# Patient Record
Sex: MALE | Race: WHITE
[De-identification: names, ages, dates, MRNs, and addresses within clinical notes are randomized per-mention and may not be internally consistent; named-entity substitution may affect disease eponyms.]

---

## 2023-07-07 ENCOUNTER — HOSPITAL ENCOUNTER (EMERGENCY)
Dept: HOSPITAL 33 - ED | Age: 28
Discharge: HOME | End: 2023-07-07
Payer: MEDICAID

## 2023-07-07 VITALS — HEART RATE: 72 BPM | OXYGEN SATURATION: 98 %

## 2023-07-07 VITALS — DIASTOLIC BLOOD PRESSURE: 80 MMHG | SYSTOLIC BLOOD PRESSURE: 134 MMHG

## 2023-07-07 DIAGNOSIS — R11.0: ICD-10-CM

## 2023-07-07 DIAGNOSIS — R10.11: Primary | ICD-10-CM

## 2023-07-07 LAB
ALBUMIN SERPL-MCNC: 4.5 G/DL (ref 3.5–5)
ALP SERPL-CCNC: 73 U/L (ref 38–126)
ALT SERPL-CCNC: 33 U/L (ref 0–50)
AMYLASE SERPL-CCNC: 61 U/L (ref 30–110)
ANION GAP SERPL CALC-SCNC: 14 MEQ/L (ref 5–15)
AST SERPL QL: 28 U/L (ref 17–59)
BACTERIA UR CULT: NO
BASOPHILS # BLD AUTO: 0.04 X10^3/UL (ref 0–0.4)
BASOPHILS NFR BLD AUTO: 0.4 % (ref 0–0.4)
BILIRUB BLD-MCNC: 0.6 MG/DL (ref 0.2–1.3)
BUN SERPL-MCNC: 14 MG/DL (ref 9–20)
CALCIUM SPEC-MCNC: 9.2 MG/DL (ref 8.4–10.2)
CHLORIDE SERPL-SCNC: 107 MMOL/L (ref 98–107)
CO2 SERPL-SCNC: 23 MMOL/L (ref 22–30)
CREAT SERPL-MCNC: 1.02 MG/DL (ref 0.66–1.25)
EOSINOPHIL # BLD AUTO: 0.14 X10^3/UL (ref 0–0.5)
GFR SERPLBLD BASED ON 1.73 SQ M-ARVRAT: > 60 ML/MIN
GLUCOSE SERPL-MCNC: 128 MG/DL (ref 74–106)
HCT VFR BLD AUTO: 41.8 % (ref 42–50)
HGB BLD-MCNC: 13.6 G/DL (ref 12.5–18)
IMM GRANULOCYTES # BLD: 0.05 X10^3U/L (ref 0–0.03)
IMM GRANULOCYTES NFR BLD: 0.4 % (ref 0–0.4)
LIPASE SERPL-CCNC: 72 U/L (ref 23–300)
LYMPHOCYTES # SPEC AUTO: 2.4 X10^3/UL (ref 1–4.6)
MCH RBC QN AUTO: 26.2 PG (ref 26–32)
MCHC RBC AUTO-ENTMCNC: 32.5 G/DL (ref 32–36)
MONOCYTES # BLD AUTO: 0.75 X10^3/UL (ref 0–1.3)
NRBC # BLD AUTO: 0 X10^3U/L (ref 0–0.01)
NRBC BLD AUTO-RTO: 0 % (ref 0–0.1)
PLATELET # BLD AUTO: 263 X10^3/UL (ref 150–450)
POTASSIUM SERPLBLD-SCNC: 4.2 MMOL/L (ref 3.5–5.1)
PROT SERPL-MCNC: 7.8 G/DL (ref 6.3–8.2)
RBC # BLD AUTO: 5.19 X10^6/UL (ref 4.1–5.6)
RBC # URNS HPF: (no result) /HPF (ref 0–5)
SODIUM SERPL-SCNC: 140 MMOL/L (ref 137–145)
WBC # BLD AUTO: 11.2 X10^3/UL (ref 4–10.5)
WBC URNS QL MICRO: (no result) /HPF (ref 0–5)

## 2023-07-07 PROCEDURE — 96375 TX/PRO/DX INJ NEW DRUG ADDON: CPT

## 2023-07-07 PROCEDURE — 85025 COMPLETE CBC W/AUTO DIFF WBC: CPT

## 2023-07-07 PROCEDURE — 74176 CT ABD & PELVIS W/O CONTRAST: CPT

## 2023-07-07 PROCEDURE — 80053 COMPREHEN METABOLIC PANEL: CPT

## 2023-07-07 PROCEDURE — 81001 URINALYSIS AUTO W/SCOPE: CPT

## 2023-07-07 PROCEDURE — 36415 COLL VENOUS BLD VENIPUNCTURE: CPT

## 2023-07-07 PROCEDURE — 83690 ASSAY OF LIPASE: CPT

## 2023-07-07 PROCEDURE — 82150 ASSAY OF AMYLASE: CPT

## 2023-07-07 PROCEDURE — 96374 THER/PROPH/DIAG INJ IV PUSH: CPT

## 2023-07-07 PROCEDURE — 99284 EMERGENCY DEPT VISIT MOD MDM: CPT

## 2023-07-07 PROCEDURE — 36000 PLACE NEEDLE IN VEIN: CPT

## 2023-07-07 NOTE — ERPHSYRPT
- History of Present Illness


Time Seen by Provider: 07/07/23 14:34


Historian: patient


Exam Limitations: no limitations


Physician History: 





This is a 28-year-old white male who presents with 2 episodes of right upper 

quadrant abdominal pain.  Patient states he did not eat breakfast but he did 

have chicken nuggets for lunch.  Approxi-12:30 PM he began having the pain it r

esolved and then recurred.  The pain was severe he had associated nausea without

vomiting.  Patient states that he was in [employer] over the last several months and 

he has had these episodes at that time.  He was told he may have a gallbladder 

issue.  Patient denies chest pain.  Patient denies fevers.  He denies cough.  He

does not have shortness of breath.


Timing/Duration: today


Abdominal Pain Onset Location: RUQ


Pain Radiation: no radiation


Severity of Pain-Max: moderate


Severity of Pain-Current: mild (To moderate)


Modifying Factors: Improves With: nothing


Associated Symptoms: nausea, No chest pain, No diarrhea, No fever/chills, No 

shortness of breath, No vomiting


Previous symptoms: same symptoms as today, no recent treatment


Allergies/Adverse Reactions: 








codeine Allergy (Unknown, Verified 07/07/23 14:42)


   





Home Medications: 








No Reportable Medications [No Reported Medications]  07/07/23 [History]








Travel Risk





- International Travel


Have you traveled outside of the country in past 3 weeks: No





- Coronavirus Screening


Are you exhibiting any of the following symptoms?: No


Close contact with a COVID-19 positive Pt in past 14-21 Days: No





- Review of Systems


Constitutional: No Symptoms


Eyes: No Symptoms


Ears, Nose, & Throat: No Symptoms


Respiratory: No Symptoms


Cardiac: No Symptoms


Abdominal/Gastrointestinal: Abdominal Pain (Right upper quadrant), Nausea, No 

Constipation


Genitourinary Symptoms: No Symptoms


Musculoskeletal: No Symptoms


Skin: No Symptoms


Neurological: No Symptoms


Psychological: No Symptoms


Endocrine: No Symptoms


Hematologic/Lymphatic: No Symptoms


Immunological/Allergic: No Symptoms


All Other Systems: Reviewed and Negative





- Past Medical History


Pertinent Past Medical History: No





- Past Surgical History


Past Surgical History: No





- Nursing Vital Signs


Nursing Vital Signs: 


                               Initial Vital Signs











Temperature  97.3 F   07/07/23 14:37


 


Pulse Rate  84   07/07/23 14:37


 


Respiratory Rate  20   07/07/23 14:37


 


Blood Pressure  134/80   07/07/23 14:37


 


O2 Sat by Pulse Oximetry  97   07/07/23 14:37








                                   Pain Scale











Pain Intensity                 4

















- Physical Exam


General Appearance: no apparent distress, alert, anxiety


Eye Exam: PERRL/EOMI, eyes nml inspection


Ears, Nose, Throat Exam: normal ENT inspection, moist mucous membranes


Neck Exam: normal inspection, non-tender, supple, full range of motion


Respiratory Exam: normal breath sounds, lungs clear, airway intact, No chest 

tenderness, No respiratory distress


Cardiovascular Exam: regular rate/rhythm, normal heart sounds, normal peripheral

 pulses


Gastrointestinal/Abdomen Exam: soft, normal bowel sounds, tenderness (Right 

upper quadrant), guarding (Mild right upper quadrant palpation), No rebound


Rectal Exam: not done


Back Exam: normal inspection, normal range of motion, No CVA tenderness, No 

vertebral tenderness


Extremity Exam: normal inspection, normal range of motion, pelvis stable


Neurologic Exam: alert, oriented x 3, cooperative, CNs II-XII nml as tested, 

normal mood/affect, nml cerebellar function, nml station & gait, sensation nml


Skin Exam: normal color, warm, dry


Lymphatic Exam: No adenopathy


**SpO2 Interpretation**: normal


O2 Delivery: Room Air





- Course


Nursing assessment & vital signs reviewed: Yes


Ordered Tests: 


                               Active Orders 24 hr











 Category Date Time Status


 


 IV Insertion STAT Care  07/07/23 14:45 Active


 


 ABDOMEN AND PELVIS W/0 CONTRAS [CT] Stat Exams  07/07/23 14:45 Completed


 


 AMYLASE Stat Lab  07/07/23 14:30 Completed


 


 CBC W DIFF Stat Lab  07/07/23 14:30 Completed


 


 CMP Stat Lab  07/07/23 14:30 Completed


 


 LIPASE Stat Lab  07/07/23 14:30 Completed


 


 UA W/RFX UR CULTURE Stat Lab  07/07/23 16:55 Completed








Medication Summary














Discontinued Medications














Generic Name Dose Route Start Last Admin





  Trade Name Freq  PRN Reason Stop Dose Admin


 


Hydromorphone HCl  0.5 mg  07/07/23 14:45  07/07/23 15:17





  Hydromorphone 1 Mg/1ml Inj***  IV  07/07/23 14:46  0.5 mg





  STAT ONE   Administration


 


Hydromorphone HCl  Confirm  07/07/23 15:15 





  Hydromorphone 1 Mg/1ml Inj***  Administered  07/07/23 15:16 





  Dose  





  1 mg  





  .ROUTE  





  .STK-MED ONE  


 


Sodium Chloride  1,000 mls @ 999 mls/hr  07/07/23 14:45  07/07/23 16:35





  Sodium Chloride 0.9% 1000 Ml  IV  07/07/23 15:45  Infused





  .Q1H1M STA   Infusion


 


Sodium Chloride  Confirm  07/07/23 15:15 





  Sodium Chloride 0.9% 1000 Ml  Administered  07/07/23 15:16 





  Dose  





  1,000 mls @ ud  





  .ROUTE  





  .STK-MED ONE  


 


Ketorolac Tromethamine  30 mg  07/07/23 14:45  07/07/23 15:16





  Ketorolac Tromethamine 30 Mg/Ml Inj  IV  07/07/23 14:46  30 mg





  STAT ONE   Administration


 


Ketorolac Tromethamine  Confirm  07/07/23 15:14 





  Ketorolac Tromethamine 30 Mg/Ml Inj  Administered  07/07/23 15:15 





  Dose  





  30 mg  





  .ROUTE  





  .STK-MED ONE  


 


Ondansetron HCl  4 mg  07/07/23 14:45  07/07/23 15:16





  Ondansetron Hcl 4 Mg/2 Ml Vial  IV  07/07/23 14:46  4 mg





  STAT ONE   Administration


 


Ondansetron HCl  Confirm  07/07/23 15:14 





  Ondansetron Hcl 4 Mg/2 Ml Vial  Administered  07/07/23 15:15 





  Dose  





  4 mg  





  .ROUTE  





  .STK-MED ONE  











Lab/Rad Data: 


                           Laboratory Result Diagrams





                                 07/07/23 14:30 





                                 07/07/23 14:30 





                               Laboratory Results











  07/07/23 07/07/23 07/07/23 Range/Units





  16:55 14:30 14:30 


 


WBC    11.2 H  (4.0-10.5)  x10^3/uL


 


RBC    5.19  (4.1-5.6)  x10^6/uL


 


Hgb    13.6  (12.5-18.0)  g/dL


 


Hct    41.8 L  (42-50)  %


 


MCV    80.5  ()  fL


 


MCH    26.2  (26-32)  pg


 


MCHC    32.5  (32-36)  g/dL


 


RDW    13.2  (11.5-14.0)  %


 


Plt Count    263  (150-450)  x10^3/uL


 


MPV    11.6 H  (7.5-11.0)  fL


 


Gran %    69.8 H  (36.0-66.0)  %


 


Immature Gran % (Auto)    0.4  (0.00-0.4)  %


 


Nucleat RBC Rel Count    0.0  (0.00-0.1)  %


 


Eos # (Auto)    0.14  (0-0.5)  x10^3/uL


 


Immature Gran # (Auto)    0.05 H  (0.00-0.03)  x10^3u/L


 


Absolute Lymphs (auto)    2.40  (1.0-4.6)  x10^3/uL


 


Absolute Monos (auto)    0.75  (0.0-1.3)  x10^3/uL


 


Absolute Nucleated RBC    0.00  (0.00-0.01)  x10^3u/L


 


Lymphocytes %    21.4 L  (24.0-44.0)  %


 


Monocytes %    6.7  (0.0-12.0)  %


 


Eosinophils %    1.3  (0.00-5.0)  %


 


Basophils %    0.4  (0.0-0.4)  %


 


Absolute Granulocytes    7.82 H  (1.4-6.9)  x10^3/uL


 


Basophils #    0.04  (0-0.4)  x10^3/uL


 


Sodium   140   (137-145)  mmol/L


 


Potassium   4.2   (3.5-5.1)  mmol/L


 


Chloride   107   ()  mmol/L


 


Carbon Dioxide   23   (22-30)  mmol/L


 


Anion Gap   14.0   (5-15)  MEQ/L


 


BUN   14   (9-20)  mg/dL


 


Creatinine   1.02   (0.66-1.25)  mg/dL


 


Estimated GFR   > 60.0   ML/MIN


 


Glucose   128 H   ()  mg/dL


 


Calcium   9.2   (8.4-10.2)  mg/dL


 


Total Bilirubin   0.60   (0.2-1.3)  mg/dL


 


AST   28   (17-59)  U/L


 


ALT   33   (0-50)  U/L


 


Alkaline Phosphatase   73   ()  U/L


 


Serum Total Protein   7.8   (6.3-8.2)  g/dL


 


Albumin   4.5   (3.5-5.0)  g/dL


 


Amylase   61   ()  U/L


 


Lipase   72   ()  U/L


 


Urine Color  Yellow    (Yellow)  


 


Urine Appearance  Clear    (Clear)  


 


Urine pH  7.5    (4.6-8.0)  


 


Ur Specific Gravity  1.020    (1.005-1.030)  


 


Urine Protein  Negative    (Negative)  


 


Urine Glucose (UA)  Negative    (Negative)  mg/dL


 


Urine Ketones  Negative    (Negative)  


 


Urine Blood  Negative    (Negative)  


 


Urine Nitrite  Negative    (Negative)  


 


Urine Bilirubin  Negative    (Negative)  


 


Urine Urobilinogen  0.2    (0.2)  mg/dL


 


Ur Leukocyte Esterase  Negative    (Negative)  


 


U Hyaline Cast (Auto)  NONE SEEN    (0-2)  /LPF


 


Urine Microscopic RBC  0-2    (0-5)  /HPF


 


Urine Microscopic WBC  0-2    (0-5)  /HPF


 


Ur Epithelial Cells  None Seen    (None Seen)  /HPF


 


Urine Bacteria  None Seen    (None Seen)  /HPF


 


Urine Culture Reflexed  NO    (NO)  














- Progress


Progress: improved, re-examined


Progress Note: 





07/07/23 15:31


CT scan of the abdomen pelvis without contrast was interpreted by the 

radiologist.  I reviewed the impression.  There is a normal appendix.  The 

gallbladder is partially contracted without evidence of gallstones or biliary 

distention.  No other acute intra-abdominal intrapelvic findings present.


07/07/23 16:39


This patient's medical issue is 1 of moderate complexity.  The level of 

complexity and the work-up performed is based on review of the patient's past 

medical history, review of the patient's medication list, review of the 

patient's drug allergy list, history of present illness and physical findings on

 examination.  The work-up in this patient includes a urinalysis, CT scan of the

 abdomen pelvis, placement of an intravenous line with infusion of 1 L of normal

 saline solution, infusion of Toradol intravenously, Dilaudid intravenously and 

Zofran intravenously.  We also janie CBC, CMP, amylase and lipase.  Review of the

 CAT scan of the abdomen pelvis shows no acute intra-abdominal or intrapelvic 

findings at this time.  We are awaiting the urinalysis.  The remainder of the 

blood work results are negative for any acute, emergent medical condition.  

Patient will be discharged to home.  We will add an antibiotic if the urinalysis

 shows infection.  Otherwise the patient will follow-up with his primary care 

physician as an outpatient and obtain a gallbladder ultrasound or HIDA scan if 

indicated.


Counseled pt/family regarding: lab results, diagnosis, need for follow-up, rad 

results





Medical Desision Making





- Diagnostic Testing


Diagnostic test were ordered, analyzed, and reviewed by me: Yes


Radiological Interpretation: Reviewed by me, Teleradiologist Report





- Risk of complications


Minimal Risk: Minimal risk of morbidity





- Departure


Departure Disposition: Home


Clinical Impression: 


 Right upper quadrant abdominal pain





Condition: Stable


Critical Care Time: No


Referrals: 


DOCTOR,NO FAMILY [Primary Care Provider] - Follow up/PCP as directed


Additional Instructions: 


Avoid fatty greasy spicy foods.  Follow-up with your primary care provider on 

7/10/2023 for further evaluation including gallbladder ultrasound and HIDA scan 

if indicated.  Use Tylenol and ibuprofen for pain control.

## 2023-07-07 NOTE — XRAY
Indication: Right upper quadrant pain.



Multiple contiguous axial images obtained through the abdomen and pelvis

without contrast.



Comparison: None



Lung bases demonstrate small right mid lung and subcarinal calcified

granulomas.  No infiltrate or effusion.  Heart not enlarged.



Stomach is distended with food/fluid.  Noncontrasted stomach and bowel loops

appear nonobstructed with normal appendix.  Gallbladder partially contracted

without gallstones or biliary distention.  No free fluid/air.



Remaining liver, gallbladder, pancreas, spleen, adrenal glands, kidneys,

ureters, bladder, and aorta are unremarkable for noncontrast exam.



Osseous structures intact.  No ventral or inguinal hernias.



Impression:  CT abdomen/pelvis without contrast exam is negative.  Incidental

old granulomatous disease.

## 2023-07-17 ENCOUNTER — HOSPITAL ENCOUNTER (EMERGENCY)
Dept: HOSPITAL 33 - ED | Age: 28
Discharge: HOME | End: 2023-07-17
Payer: MEDICAID

## 2023-07-17 VITALS — OXYGEN SATURATION: 98 %

## 2023-07-17 VITALS — HEART RATE: 72 BPM

## 2023-07-17 VITALS — SYSTOLIC BLOOD PRESSURE: 124 MMHG | DIASTOLIC BLOOD PRESSURE: 76 MMHG

## 2023-07-17 DIAGNOSIS — R10.11: ICD-10-CM

## 2023-07-17 DIAGNOSIS — Z28.310: ICD-10-CM

## 2023-07-17 DIAGNOSIS — K52.9: Primary | ICD-10-CM

## 2023-07-17 LAB
ALBUMIN SERPL-MCNC: 4.5 G/DL (ref 3.5–5)
ALP SERPL-CCNC: 78 U/L (ref 38–126)
ALT SERPL-CCNC: 22 U/L (ref 0–50)
AMYLASE SERPL-CCNC: 60 U/L (ref 30–110)
ANION GAP SERPL CALC-SCNC: 12.4 MEQ/L (ref 5–15)
AST SERPL QL: 27 U/L (ref 17–59)
BACTERIA UR CULT: NO
BASOPHILS # BLD AUTO: 0.04 X10^3/UL (ref 0–0.4)
BASOPHILS NFR BLD AUTO: 0.3 % (ref 0–0.4)
BILIRUB BLD-MCNC: 0.4 MG/DL (ref 0.2–1.3)
BUN SERPL-MCNC: 14 MG/DL (ref 9–20)
CALCIUM SPEC-MCNC: 9.3 MG/DL (ref 8.4–10.2)
CHLORIDE SERPL-SCNC: 104 MMOL/L (ref 98–107)
CO2 SERPL-SCNC: 27 MMOL/L (ref 22–30)
CREAT SERPL-MCNC: 1.04 MG/DL (ref 0.66–1.25)
EOSINOPHIL # BLD AUTO: 0.17 X10^3/UL (ref 0–0.5)
GFR SERPLBLD BASED ON 1.73 SQ M-ARVRAT: > 60 ML/MIN
GLUCOSE SERPL-MCNC: 92 MG/DL (ref 74–106)
HCT VFR BLD AUTO: 40.5 % (ref 42–50)
HGB BLD-MCNC: 13.5 G/DL (ref 12.5–18)
IMM GRANULOCYTES # BLD: 0.04 X10^3U/L (ref 0–0.03)
IMM GRANULOCYTES NFR BLD: 0.3 % (ref 0–0.4)
LIPASE SERPL-CCNC: 76 U/L (ref 23–300)
LYMPHOCYTES # SPEC AUTO: 2.75 X10^3/UL (ref 1–4.6)
MCH RBC QN AUTO: 26.7 PG (ref 26–32)
MCHC RBC AUTO-ENTMCNC: 33.3 G/DL (ref 32–36)
MONOCYTES # BLD AUTO: 0.82 X10^3/UL (ref 0–1.3)
NRBC # BLD AUTO: 0 X10^3U/L (ref 0–0.01)
NRBC BLD AUTO-RTO: 0 % (ref 0–0.1)
PLATELET # BLD AUTO: 258 X10^3/UL (ref 150–450)
POTASSIUM SERPLBLD-SCNC: 4.2 MMOL/L (ref 3.5–5.1)
PROT SERPL-MCNC: 7.8 G/DL (ref 6.3–8.2)
RBC # BLD AUTO: 5.06 X10^6/UL (ref 4.1–5.6)
RBC # URNS HPF: (no result) /HPF (ref 0–5)
SODIUM SERPL-SCNC: 139 MMOL/L (ref 137–145)
WBC # BLD AUTO: 11.6 X10^3/UL (ref 4–10.5)
WBC URNS QL MICRO: (no result) /HPF (ref 0–5)

## 2023-07-17 PROCEDURE — 81001 URINALYSIS AUTO W/SCOPE: CPT

## 2023-07-17 PROCEDURE — 74177 CT ABD & PELVIS W/CONTRAST: CPT

## 2023-07-17 PROCEDURE — 99283 EMERGENCY DEPT VISIT LOW MDM: CPT

## 2023-07-17 PROCEDURE — 85025 COMPLETE CBC W/AUTO DIFF WBC: CPT

## 2023-07-17 PROCEDURE — 80053 COMPREHEN METABOLIC PANEL: CPT

## 2023-07-17 PROCEDURE — 82150 ASSAY OF AMYLASE: CPT

## 2023-07-17 PROCEDURE — 36415 COLL VENOUS BLD VENIPUNCTURE: CPT

## 2023-07-17 PROCEDURE — 36000 PLACE NEEDLE IN VEIN: CPT

## 2023-07-17 PROCEDURE — 83690 ASSAY OF LIPASE: CPT

## 2023-07-17 NOTE — ERPHSYRPT
- History of Present Illness


Historian: patient


Exam Limitations: no limitations


Patient Subjective Stated Complaint: Abdominal pain


Triage Nursing Assessment: Patient ambulated back to ED and transferred self to 

bed. Patient A+O X 3. Patient's skin pink, warm and dry. Patient complains of 

RUQ pain while he was at work, but denies pain or discomfort. Patient has 

appointment with PCP to be referred to Dr. Stewart tomorrow. Patient currently 

denies pain or discomfort, N/V.


Physician History: 





29 yo WM w intermittent RUQ pain x 2 wks. Pt was seen in the ER on 7/7/23 w neg 

CT ab-pelvis/neg lag work. Pain started while he was at work today but is now 

pain free. It was 7/10 at its max and described as sharp wo radiation. He denied

nausea/vomiting but has had some diarrhea. 

Melena/hematochezia/dysuria/hematuria/chest pain are all denied.


Timing/Duration: resolved prior to arrival


Activities at Onset: other (Assembly line)


Quality: sharpness


Abdominal Pain Onset Location: RUQ


Pain Radiation: no radiation


Severity of Pain-Max: moderate


Severity of Pain-Current: none


Modifying Factors: Improves With: nothing


Associated Symptoms: denies symptoms, diarrhea


Previous symptoms: same symptoms as today


Allergies/Adverse Reactions: 








codeine Allergy (Unknown, Verified 07/17/23 17:50)


   





Hx Tetanus, Diphtheria Vaccination/Date Given: No


Hx Influenza Vaccination/Date Given: No


Hx Pneumococcal Vaccination/Date Given: No


Immunizations Up to Date: Yes





Travel Risk





- International Travel


Have you traveled outside of the country in past 3 weeks: No





- Coronavirus Screening


Are you exhibiting any of the following symptoms?: No


Close contact with a COVID-19 positive Pt in past 14-21 Days: No





- Vaccine Status


Have you recieved a Covid-19 vaccination: No





- Review of Systems


Constitutional: No Symptoms


Eyes: No Symptoms


Ears, Nose, & Throat: No Symptoms


Respiratory: No Symptoms


Cardiac: No Symptoms


Abdominal/Gastrointestinal: No Symptoms, Abdominal Pain, Diarrhea


Genitourinary Symptoms: No Symptoms


Musculoskeletal: No Symptoms


Skin: No Symptoms


Neurological: No Symptoms


Psychological: No Symptoms


Endocrine: No Symptoms


Hematologic/Lymphatic: No Symptoms


Immunological/Allergic: No Symptoms





- Past Medical History


Pertinent Past Medical History: No


Psycho-Social History: Attention Deficit Disorder





- Past Surgical History


Past Surgical History: No





- Social History


Smoking Status: Never smoker


Exposure to second hand smoke: Yes


Drug Use: marijuana


Patient Lives Alone: No





- Nursing Vital Signs


Nursing Vital Signs: 


                               Initial Vital Signs











Temperature  97.4 F   07/17/23 17:51


 


Pulse Rate  77   07/17/23 17:51


 


Respiratory Rate  18   07/17/23 17:51


 


Blood Pressure  127/74   07/17/23 17:51


 


O2 Sat by Pulse Oximetry  98   07/17/23 17:51








                                   Pain Scale











Pain Intensity                 3











WNL





- Physical Exam


General Appearance: no apparent distress


Eye Exam: PERRL/EOMI, eyes nml inspection


Ears, Nose, Throat Exam: normal ENT inspection, TMs normal, pharynx normal, 

moist mucous membranes


Neck Exam: normal inspection, non-tender, supple, full range of motion, No 

meningismus, No mass, No Brudzinski, No Kernig's


Respiratory Exam: normal breath sounds, lungs clear, airway intact, No re

spiratory distress


Cardiovascular Exam: regular rate/rhythm, normal heart sounds, normal peripheral

pulses, capillary refill <2 sec, No murmur


Gastrointestinal/Abdomen Exam: soft, normal bowel sounds, tenderness (Very mild 

RUQ TTP wo guarding or rebound)


Back Exam: normal inspection, normal range of motion, No CVA tenderness, No 

vertebral tenderness


Extremity Exam: normal inspection, normal range of motion


Neurologic Exam: alert, oriented x 3, cooperative, CNs II-XII nml as tested, 

normal mood/affect, nml cerebellar function, nml station & gait, sensation nml


Skin Exam: normal color, warm, dry


Lymphatic Exam: No adenopathy


**SpO2 Interpretation**: normal


SpO2: 98


O2 Delivery: Room Air





- Course


Nursing assessment & vital signs reviewed: Yes





- CT Exams


  ** Abdomen/Pelvis


CT Interpretation: Discussed w/radiologist (Mild diffuse colitis)


Ordered Tests: 


                               Active Orders 24 hr











 Category Date Time Status


 


 IV Insertion STAT Care  07/17/23 17:57 Active


 


 ABDOMEN AND PELVIS W CONTRAST [CT] Stat Exams  07/17/23 18:56 Taken


 


 AMYLASE Stat Lab  07/17/23 17:50 Completed


 


 CBC W DIFF Stat Lab  07/17/23 17:50 Completed


 


 CMP Stat Lab  07/17/23 17:50 Completed


 


 LIPASE Stat Lab  07/17/23 17:50 Completed


 


 UA W/RFX UR CULTURE Stat Lab  07/17/23 17:57 Completed








Medication Summary














Discontinued Medications














Generic Name Dose Route Start Last Admin





  Trade Name Venkat  PRN Reason Stop Dose Admin


 


Metronidazole  500 mg  07/17/23 20:01  07/17/23 20:02





  Metronidazole 500 Mg Tablet  PO  07/17/23 20:02  500 mg





  STAT ONE   Administration











Lab/Rad Data: 


                           Laboratory Result Diagrams





                                 07/17/23 17:50 





                                 07/17/23 17:50 





                               Laboratory Results











  07/17/23 07/17/23 07/17/23 Range/Units





  17:57 17:50 17:50 


 


WBC    11.6 H  (4.0-10.5)  x10^3/uL


 


RBC    5.06  (4.1-5.6)  x10^6/uL


 


Hgb    13.5  (12.5-18.0)  g/dL


 


Hct    40.5 L  (42-50)  %


 


MCV    80.0  ()  fL


 


MCH    26.7  (26-32)  pg


 


MCHC    33.3  (32-36)  g/dL


 


RDW    13.2  (11.5-14.0)  %


 


Plt Count    258  (150-450)  x10^3/uL


 


MPV    11.2 H  (7.5-11.0)  fL


 


Gran %    67.1 H  (36.0-66.0)  %


 


Immature Gran % (Auto)    0.3  (0.00-0.4)  %


 


Nucleat RBC Rel Count    0.0  (0.00-0.1)  %


 


Eos # (Auto)    0.17  (0-0.5)  x10^3/uL


 


Immature Gran # (Auto)    0.04 H  (0.00-0.03)  x10^3u/L


 


Absolute Lymphs (auto)    2.75  (1.0-4.6)  x10^3/uL


 


Absolute Monos (auto)    0.82  (0.0-1.3)  x10^3/uL


 


Absolute Nucleated RBC    0.00  (0.00-0.01)  x10^3u/L


 


Lymphocytes %    23.7 L  (24.0-44.0)  %


 


Monocytes %    7.1  (0.0-12.0)  %


 


Eosinophils %    1.5  (0.00-5.0)  %


 


Basophils %    0.3  (0.0-0.4)  %


 


Absolute Granulocytes    7.78 H  (1.4-6.9)  x10^3/uL


 


Basophils #    0.04  (0-0.4)  x10^3/uL


 


Sodium   139   (137-145)  mmol/L


 


Potassium   4.2   (3.5-5.1)  mmol/L


 


Chloride   104   ()  mmol/L


 


Carbon Dioxide   27   (22-30)  mmol/L


 


Anion Gap   12.4   (5-15)  MEQ/L


 


BUN   14   (9-20)  mg/dL


 


Creatinine   1.04   (0.66-1.25)  mg/dL


 


Estimated GFR   > 60.0   ML/MIN


 


Glucose   92   ()  mg/dL


 


Calcium   9.3   (8.4-10.2)  mg/dL


 


Total Bilirubin   0.40   (0.2-1.3)  mg/dL


 


AST   27   (17-59)  U/L


 


ALT   22   (0-50)  U/L


 


Alkaline Phosphatase   78   ()  U/L


 


Serum Total Protein   7.8   (6.3-8.2)  g/dL


 


Albumin   4.5   (3.5-5.0)  g/dL


 


Amylase   60   ()  U/L


 


Lipase   76   ()  U/L


 


Urine Color  Yellow    (Yellow)  


 


Urine Appearance  Clear    (Clear)  


 


Urine pH  6.5    (4.6-8.0)  


 


Ur Specific Gravity  <=1.005    (1.005-1.030)  


 


Urine Protein  Negative    (Negative)  


 


Urine Glucose (UA)  Negative    (Negative)  mg/dL


 


Urine Ketones  Negative    (Negative)  


 


Urine Blood  Negative    (Negative)  


 


Urine Nitrite  Negative    (Negative)  


 


Urine Bilirubin  Negative    (Negative)  


 


Urine Urobilinogen  0.2    (0.2)  mg/dL


 


Ur Leukocyte Esterase  Negative    (Negative)  


 


U Hyaline Cast (Auto)  NONE SEEN    (0-2)  /LPF


 


Urine Microscopic RBC  0-2    (0-5)  /HPF


 


Urine Microscopic WBC  0-2    (0-5)  /HPF


 


Ur Epithelial Cells  None Seen    (None Seen)  /HPF


 


Urine Bacteria  None Seen    (None Seen)  /HPF


 


Urine Culture Reflexed  NO    (NO)  














- Progress


Progress Note: 





07/17/23 20:04


Nursing note and vital signs reviewed


No food or housing insecurities noted


All lab results reviewed and shared w pt


CT results reviewed and shared w pt


Pt refused all pain meds during stay


500mg po Flagyl


Counseled pt/family regarding: lab results, diagnosis, need for follow-up, rad 

results





Medical Desision Making





- Diagnostic Testing


Diagnostic test were ordered, analyzed, and reviewed by me: Yes


Radiological Interpretation: Reviewed by me





- Risk of complications


The pt has a mod risk of morbidity or mortality based on: Need for prescription 

drug management





- Departure


Departure Disposition: Home


Clinical Impression: 


 Colitis





Condition: Stable


Critical Care Time: No


Referrals: 


DOCTOR,NO FAMILY [NON-STAFF PHY W/O PRIVILEGES] - Follow up/PCP as directed


Instructions:  Colitis, Severe Abdominal Pain, Adult (DC)


Additional Instructions: 


Fluids


Continue with Flagyl tomorrow(No alcohol w Flagyl)


Bentyl as needed for pain


Keep your appointment w NP tomorrow


Return to ER for increasing pain or temperature greater than 100.5


Forms:  Work/School Release Form


Prescriptions: 


Dicyclomine HCl 20 mg*** [Bentyl 20 mg***] 20 mg PO QID PRN PRN #15 tablet


 PRN Reason: Pain


Metronidazole 500 mg*** [Flagyl 500 MG***] 500 mg PO TID 7 Days #21 tablet